# Patient Record
Sex: FEMALE | Race: BLACK OR AFRICAN AMERICAN | NOT HISPANIC OR LATINO | Employment: OTHER | URBAN - METROPOLITAN AREA
[De-identification: names, ages, dates, MRNs, and addresses within clinical notes are randomized per-mention and may not be internally consistent; named-entity substitution may affect disease eponyms.]

---

## 2024-02-06 ENCOUNTER — APPOINTMENT (OUTPATIENT)
Dept: PRIMARY CARE | Facility: CLINIC | Age: 54
End: 2024-02-06
Payer: MEDICARE

## 2024-03-05 ENCOUNTER — OFFICE VISIT (OUTPATIENT)
Dept: PRIMARY CARE | Facility: CLINIC | Age: 54
End: 2024-03-05
Payer: MEDICARE

## 2024-03-05 VITALS
HEART RATE: 78 BPM | WEIGHT: 245.6 LBS | TEMPERATURE: 97.3 F | SYSTOLIC BLOOD PRESSURE: 138 MMHG | HEIGHT: 65 IN | BODY MASS INDEX: 40.92 KG/M2 | DIASTOLIC BLOOD PRESSURE: 88 MMHG

## 2024-03-05 DIAGNOSIS — G47.8: ICD-10-CM

## 2024-03-05 DIAGNOSIS — Z12.39 BREAST SCREENING: Primary | ICD-10-CM

## 2024-03-05 DIAGNOSIS — B35.0 TINEA CAPITIS: ICD-10-CM

## 2024-03-05 DIAGNOSIS — Z12.31 ENCOUNTER FOR SCREENING MAMMOGRAM FOR MALIGNANT NEOPLASM OF BREAST: ICD-10-CM

## 2024-03-05 DIAGNOSIS — E78.9 LIPID DISORDER: ICD-10-CM

## 2024-03-05 DIAGNOSIS — I10 HYPERTENSION, UNSPECIFIED TYPE: ICD-10-CM

## 2024-03-05 DIAGNOSIS — I47.10 SVT (SUPRAVENTRICULAR TACHYCARDIA) (CMS-HCC): ICD-10-CM

## 2024-03-05 DIAGNOSIS — Z76.89 ENCOUNTER TO ESTABLISH CARE: ICD-10-CM

## 2024-03-05 PROCEDURE — 3075F SYST BP GE 130 - 139MM HG: CPT | Performed by: FAMILY MEDICINE

## 2024-03-05 PROCEDURE — 3079F DIAST BP 80-89 MM HG: CPT | Performed by: FAMILY MEDICINE

## 2024-03-05 PROCEDURE — 1036F TOBACCO NON-USER: CPT | Performed by: FAMILY MEDICINE

## 2024-03-05 PROCEDURE — 99204 OFFICE O/P NEW MOD 45 MIN: CPT | Performed by: FAMILY MEDICINE

## 2024-03-05 RX ORDER — KETOCONAZOLE 20 MG/ML
SHAMPOO, SUSPENSION TOPICAL 2 TIMES WEEKLY
Qty: 120 ML | Refills: 0 | Status: SHIPPED | OUTPATIENT
Start: 2024-03-07

## 2024-03-05 RX ORDER — TRIAMCINOLONE ACETONIDE 0.25 MG/ML
LOTION TOPICAL NIGHTLY
Qty: 60 ML | Refills: 3 | Status: SHIPPED | OUTPATIENT
Start: 2024-03-05
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
## 2024-03-11 ENCOUNTER — APPOINTMENT (OUTPATIENT)
Dept: RADIOLOGY | Facility: HOSPITAL | Age: 54
End: 2024-03-11
Payer: MEDICARE

## 2024-03-21 ENCOUNTER — HOSPITAL ENCOUNTER (OUTPATIENT)
Dept: RADIOLOGY | Facility: HOSPITAL | Age: 54
Discharge: HOME | End: 2024-03-21
Payer: MEDICARE

## 2024-03-21 DIAGNOSIS — Z12.39 BREAST SCREENING: ICD-10-CM

## 2024-03-21 DIAGNOSIS — Z12.31 ENCOUNTER FOR SCREENING MAMMOGRAM FOR MALIGNANT NEOPLASM OF BREAST: ICD-10-CM

## 2024-03-25 ENCOUNTER — APPOINTMENT (OUTPATIENT)
Dept: RADIOLOGY | Facility: HOSPITAL | Age: 54
End: 2024-03-25
Payer: MEDICARE

## 2024-03-29 ENCOUNTER — APPOINTMENT (OUTPATIENT)
Dept: RADIOLOGY | Facility: HOSPITAL | Age: 54
End: 2024-03-29
Payer: MEDICARE

## 2024-04-11 ENCOUNTER — APPOINTMENT (OUTPATIENT)
Dept: RADIOLOGY | Facility: HOSPITAL | Age: 54
End: 2024-04-11
Payer: MEDICARE

## 2024-04-15 ENCOUNTER — APPOINTMENT (OUTPATIENT)
Dept: RADIOLOGY | Facility: HOSPITAL | Age: 54
End: 2024-04-15
Payer: MEDICARE

## 2024-04-17 ENCOUNTER — APPOINTMENT (OUTPATIENT)
Dept: CARDIOLOGY | Facility: CLINIC | Age: 54
End: 2024-04-17
Payer: MEDICARE

## 2024-04-19 ENCOUNTER — APPOINTMENT (OUTPATIENT)
Dept: RADIOLOGY | Facility: HOSPITAL | Age: 54
End: 2024-04-19
Payer: MEDICARE

## 2024-04-23 ENCOUNTER — APPOINTMENT (OUTPATIENT)
Dept: RADIOLOGY | Facility: HOSPITAL | Age: 54
End: 2024-04-23
Payer: MEDICARE

## 2024-05-03 ENCOUNTER — APPOINTMENT (OUTPATIENT)
Dept: RADIOLOGY | Facility: HOSPITAL | Age: 54
End: 2024-05-03
Payer: MEDICARE

## 2024-05-10 ENCOUNTER — APPOINTMENT (OUTPATIENT)
Dept: CARDIOLOGY | Facility: HOSPITAL | Age: 54
End: 2024-05-10
Payer: MEDICARE

## 2024-05-21 ENCOUNTER — APPOINTMENT (OUTPATIENT)
Dept: RADIOLOGY | Facility: HOSPITAL | Age: 54
End: 2024-05-21
Payer: MEDICARE

## 2024-05-24 ENCOUNTER — APPOINTMENT (OUTPATIENT)
Dept: RADIOLOGY | Facility: HOSPITAL | Age: 54
End: 2024-05-24
Payer: MEDICARE

## 2024-06-04 ENCOUNTER — OFFICE VISIT (OUTPATIENT)
Dept: PRIMARY CARE | Facility: CLINIC | Age: 54
End: 2024-06-04
Payer: MEDICARE

## 2024-06-04 ENCOUNTER — APPOINTMENT (OUTPATIENT)
Dept: RADIOLOGY | Facility: HOSPITAL | Age: 54
End: 2024-06-04
Payer: MEDICARE

## 2024-06-04 VITALS
SYSTOLIC BLOOD PRESSURE: 124 MMHG | DIASTOLIC BLOOD PRESSURE: 85 MMHG | WEIGHT: 239.8 LBS | HEIGHT: 65 IN | TEMPERATURE: 97.7 F | BODY MASS INDEX: 39.95 KG/M2 | HEART RATE: 83 BPM

## 2024-06-04 DIAGNOSIS — I10 HYPERTENSION, UNSPECIFIED TYPE: ICD-10-CM

## 2024-06-04 DIAGNOSIS — K86.1 IDIOPATHIC CHRONIC PANCREATITIS (MULTI): ICD-10-CM

## 2024-06-04 DIAGNOSIS — G47.30 SLEEP APNEA IN ADULT: ICD-10-CM

## 2024-06-04 DIAGNOSIS — R53.83 OTHER FATIGUE: ICD-10-CM

## 2024-06-04 DIAGNOSIS — E78.9 LIPID DISORDER: Primary | ICD-10-CM

## 2024-06-04 DIAGNOSIS — F31.81 BIPOLAR 2 DISORDER, MAJOR DEPRESSIVE EPISODE (MULTI): ICD-10-CM

## 2024-06-04 PROCEDURE — G0444 DEPRESSION SCREEN ANNUAL: HCPCS | Performed by: FAMILY MEDICINE

## 2024-06-04 PROCEDURE — 99214 OFFICE O/P EST MOD 30 MIN: CPT | Performed by: FAMILY MEDICINE

## 2024-06-04 PROCEDURE — 99396 PREV VISIT EST AGE 40-64: CPT | Performed by: FAMILY MEDICINE

## 2024-06-04 PROCEDURE — G0439 PPPS, SUBSEQ VISIT: HCPCS | Performed by: FAMILY MEDICINE

## 2024-06-04 PROCEDURE — 3008F BODY MASS INDEX DOCD: CPT | Performed by: FAMILY MEDICINE

## 2024-06-04 PROCEDURE — G0446 INTENS BEHAVE THER CARDIO DX: HCPCS | Performed by: FAMILY MEDICINE

## 2024-06-04 PROCEDURE — G0447 BEHAVIOR COUNSEL OBESITY 15M: HCPCS | Performed by: FAMILY MEDICINE

## 2024-06-04 PROCEDURE — 1036F TOBACCO NON-USER: CPT | Performed by: FAMILY MEDICINE

## 2024-06-04 PROCEDURE — 3079F DIAST BP 80-89 MM HG: CPT | Performed by: FAMILY MEDICINE

## 2024-06-04 PROCEDURE — 1123F ACP DISCUSS/DSCN MKR DOCD: CPT | Performed by: FAMILY MEDICINE

## 2024-06-04 PROCEDURE — 3074F SYST BP LT 130 MM HG: CPT | Performed by: FAMILY MEDICINE

## 2024-06-04 RX ORDER — SERTRALINE HYDROCHLORIDE 100 MG/1
200 TABLET, FILM COATED ORAL DAILY
COMMUNITY

## 2024-06-04 RX ORDER — BUPROPION HYDROCHLORIDE 100 MG/1
100 TABLET ORAL 2 TIMES DAILY
COMMUNITY

## 2024-06-04 RX ORDER — INSULIN GLARGINE 100 [IU]/ML
38 INJECTION, SOLUTION SUBCUTANEOUS EVERY MORNING
COMMUNITY

## 2024-06-04 RX ORDER — ARIPIPRAZOLE 5 MG/1
5 TABLET ORAL DAILY
COMMUNITY

## 2024-06-04 RX ORDER — INSULIN ASPART 100 [IU]/ML
25 INJECTION, SOLUTION INTRAVENOUS; SUBCUTANEOUS
COMMUNITY

## 2024-06-04 RX ORDER — ROSUVASTATIN CALCIUM 20 MG/1
20 TABLET, COATED ORAL NIGHTLY
Qty: 90 TABLET | Refills: 3 | Status: SHIPPED | OUTPATIENT
Start: 2024-06-04

## 2024-06-04 RX ORDER — EZETIMIBE 10 MG/1
10 TABLET ORAL DAILY
COMMUNITY

## 2024-06-04 RX ORDER — ROSUVASTATIN CALCIUM 20 MG/1
20 TABLET, COATED ORAL NIGHTLY
COMMUNITY
End: 2024-06-04 | Stop reason: SDUPTHER

## 2024-06-04 ASSESSMENT — PATIENT HEALTH QUESTIONNAIRE - PHQ9
1. LITTLE INTEREST OR PLEASURE IN DOING THINGS: SEVERAL DAYS
10. IF YOU CHECKED OFF ANY PROBLEMS, HOW DIFFICULT HAVE THESE PROBLEMS MADE IT FOR YOU TO DO YOUR WORK, TAKE CARE OF THINGS AT HOME, OR GET ALONG WITH OTHER PEOPLE: NOT DIFFICULT AT ALL
2. FEELING DOWN, DEPRESSED OR HOPELESS: SEVERAL DAYS
SUM OF ALL RESPONSES TO PHQ9 QUESTIONS 1 AND 2: 2

## 2024-06-04 NOTE — PROGRESS NOTES
"Subjective   Patient ID: Meghana Schwartz is a 54 y.o. female who presents for Medicare Annual Wellness Visit Initial.    HPI     Review of Systems   All other systems reviewed and are negative.      Objective   /85   Pulse 83   Temp 36.5 °C (97.7 °F)   Ht 1.651 m (5' 5\")   Wt 109 kg (239 lb 12.8 oz)   LMP  (LMP Unknown)   BMI 39.90 kg/m²     Physical Exam  Cardiovascular:      Rate and Rhythm: Regular rhythm.   Pulmonary:      Breath sounds: Normal breath sounds.   Abdominal:      Palpations: Abdomen is soft.   Musculoskeletal:         General: Normal range of motion.   Skin:     General: Skin is warm.   Neurological:      General: No focal deficit present.      Mental Status: She is alert.   Psychiatric:         Mood and Affect: Mood normal.         Assessment/Plan     This is a 54-year-old female who is here for Medicare well visit, preventive exam, and follow-up from the previous visit    Health maintenance    I reviewed advance care planning I gave her information and forms to get filled and the time duration that I spent is over 16 minutes      I discussed and assess the  risk factors for cardiovascular disease.  I educated patient on a healthier behavior lifestyle changes.   I advised patient to walk at least 30 minutes a day 5 days a week.  I educated on healthy eating habits and also taking a baby aspirin to avoid cardiovascular accident    Cardiovascular risk assessment showed that she is at 24.3 -time spent is over 15 minutes    Depression screening showed that she is feeling depressed several times several days a week and she is under the care of of [facility] cognitive therapy as well as medication and the screening took about 5 minutes      Follow-up    Insomnia/sleep apnea    She says she has been using the CPAP machine but has difficulty in staying asleep she has posttraumatic stress disorder nightmares dreams    I had a conversation with her about sleep ritual she says she plays the Dick or Bro and " I advised her to focus on good rather than the stressful events when she goes to bed    And if she wakes up with bad dreams to take a mind over to good place rather than staying in the bed place        Morbid obesity again she informs me that she does twice a week water aerobics and lap swimming as well as she has been walking 1 mile a day    I referred her to nutritional counseling as well    The time spent on obesity counseling is over 15 minutes    For hyperlipidemia I renewed her prescription for rosuvastatin she had missed her cardiology appointment but she will reschedule    Also she has had the colonoscopy 5 years ago in [facility] she promised to get me the report    She will be going for her mammogram coming up    Advised patient to come back in 3-month    Since she has not had a Pap test for some time advised her to schedule her Pap test and follow-up in 3 months      I reviewed her whole chart updated her medication she goes to endocrinology at [facility] she is currently on Lantus and NovoLog along with Jardiance    She goes to psychological treatment at [facility] she is on Abilify, bupropion Zoloft    For hyperlipidemia she is on Zetia and Crestor    For seborrheic dermatitis she is continue on ketoconazole and triamcinolone

## 2024-06-07 ENCOUNTER — APPOINTMENT (OUTPATIENT)
Dept: RADIOLOGY | Facility: HOSPITAL | Age: 54
End: 2024-06-07
Payer: MEDICARE

## 2024-07-02 ENCOUNTER — APPOINTMENT (OUTPATIENT)
Dept: RADIOLOGY | Facility: HOSPITAL | Age: 54
End: 2024-07-02
Payer: MEDICARE

## 2024-09-10 ENCOUNTER — APPOINTMENT (OUTPATIENT)
Dept: PRIMARY CARE | Facility: CLINIC | Age: 54
End: 2024-09-10
Payer: MEDICARE

## 2024-09-19 ENCOUNTER — HOSPITAL ENCOUNTER (OUTPATIENT)
Dept: RADIOLOGY | Facility: CLINIC | Age: 54
Discharge: HOME | End: 2024-09-19
Payer: MEDICARE

## 2024-09-19 VITALS — WEIGHT: 230 LBS | HEIGHT: 65 IN | BODY MASS INDEX: 38.32 KG/M2

## 2024-09-19 PROCEDURE — 77067 SCR MAMMO BI INCL CAD: CPT

## 2024-10-01 ENCOUNTER — APPOINTMENT (OUTPATIENT)
Dept: PRIMARY CARE | Facility: CLINIC | Age: 54
End: 2024-10-01
Payer: MEDICARE

## 2024-10-03 ENCOUNTER — HOSPITAL ENCOUNTER (OUTPATIENT)
Dept: RADIOLOGY | Facility: EXTERNAL LOCATION | Age: 54
Discharge: HOME | End: 2024-10-03

## 2024-10-28 ENCOUNTER — APPOINTMENT (OUTPATIENT)
Dept: PRIMARY CARE | Facility: CLINIC | Age: 54
End: 2024-10-28
Payer: MEDICARE

## 2024-11-13 ENCOUNTER — APPOINTMENT (OUTPATIENT)
Dept: PRIMARY CARE | Facility: CLINIC | Age: 54
End: 2024-11-13
Payer: MEDICARE

## 2024-11-25 ENCOUNTER — APPOINTMENT (OUTPATIENT)
Dept: PRIMARY CARE | Facility: CLINIC | Age: 54
End: 2024-11-25
Payer: MEDICARE

## 2024-12-10 ENCOUNTER — APPOINTMENT (OUTPATIENT)
Dept: PRIMARY CARE | Facility: CLINIC | Age: 54
End: 2024-12-10
Payer: MEDICARE

## 2025-01-02 ASSESSMENT — ENCOUNTER SYMPTOMS
SWOLLEN GLANDS: 0
NECK PAIN: 0
ORTHOPNEA: 0
HEADACHES: 0
PND: 0
LEG PAIN: 0
CLAUDICATION: 0
WHEEZING: 1
HEMOPTYSIS: 0
VOMITING: 0
SYNCOPE: 0
SORE THROAT: 0
SPUTUM PRODUCTION: 1
ABDOMINAL PAIN: 0
RHINORRHEA: 1
FEVER: 0
SHORTNESS OF BREATH: 1

## 2025-01-06 ENCOUNTER — APPOINTMENT (OUTPATIENT)
Dept: PRIMARY CARE | Facility: CLINIC | Age: 55
End: 2025-01-06
Payer: MEDICARE

## 2025-01-06 VITALS
HEART RATE: 98 BPM | DIASTOLIC BLOOD PRESSURE: 94 MMHG | BODY MASS INDEX: 39.99 KG/M2 | SYSTOLIC BLOOD PRESSURE: 152 MMHG | HEIGHT: 65 IN | WEIGHT: 240 LBS | OXYGEN SATURATION: 97 %

## 2025-01-06 DIAGNOSIS — B35.0 TINEA CAPITIS: Primary | ICD-10-CM

## 2025-01-06 DIAGNOSIS — I10 HYPERTENSION, UNSPECIFIED TYPE: ICD-10-CM

## 2025-01-06 DIAGNOSIS — R05.1 ACUTE COUGH: ICD-10-CM

## 2025-01-06 DIAGNOSIS — Z01.419 ENCOUNTER FOR GYNECOLOGICAL EXAMINATION WITHOUT ABNORMAL FINDING: ICD-10-CM

## 2025-01-06 PROCEDURE — 3080F DIAST BP >= 90 MM HG: CPT | Performed by: FAMILY MEDICINE

## 2025-01-06 PROCEDURE — 3008F BODY MASS INDEX DOCD: CPT | Performed by: FAMILY MEDICINE

## 2025-01-06 PROCEDURE — 1036F TOBACCO NON-USER: CPT | Performed by: FAMILY MEDICINE

## 2025-01-06 PROCEDURE — 99214 OFFICE O/P EST MOD 30 MIN: CPT | Performed by: FAMILY MEDICINE

## 2025-01-06 PROCEDURE — 3077F SYST BP >= 140 MM HG: CPT | Performed by: FAMILY MEDICINE

## 2025-01-06 RX ORDER — LOSARTAN POTASSIUM 100 MG/1
100 TABLET ORAL DAILY
Qty: 100 TABLET | Refills: 3 | Status: SHIPPED | OUTPATIENT
Start: 2025-01-06 | End: 2025-01-07 | Stop reason: SDUPTHER

## 2025-01-06 RX ORDER — FLUTICASONE PROPIONATE AND SALMETEROL 250; 50 UG/1; UG/1
1 POWDER RESPIRATORY (INHALATION)
Qty: 60 EACH | Refills: 11 | Status: SHIPPED | OUTPATIENT
Start: 2025-01-06 | End: 2025-01-07 | Stop reason: SDUPTHER

## 2025-01-06 RX ORDER — BENZONATATE 100 MG/1
100 CAPSULE ORAL 3 TIMES DAILY PRN
Qty: 42 CAPSULE | Refills: 0 | Status: SHIPPED | OUTPATIENT
Start: 2025-01-06 | End: 2025-01-07 | Stop reason: SDUPTHER

## 2025-01-06 RX ORDER — MONTELUKAST SODIUM 10 MG/1
10 TABLET ORAL NIGHTLY
Qty: 30 TABLET | Refills: 5 | Status: SHIPPED | OUTPATIENT
Start: 2025-01-06 | End: 2025-01-07 | Stop reason: SDUPTHER

## 2025-01-06 RX ORDER — TRIAMCINOLONE ACETONIDE 0.25 MG/ML
LOTION TOPICAL NIGHTLY
Qty: 60 ML | Refills: 3 | Status: SHIPPED | OUTPATIENT
Start: 2025-01-06 | End: 2025-01-07 | Stop reason: SDUPTHER

## 2025-01-06 RX ORDER — ALBUTEROL SULFATE 90 UG/1
2 INHALANT RESPIRATORY (INHALATION) EVERY 4 HOURS PRN
Qty: 8 G | Refills: 5 | Status: SHIPPED | OUTPATIENT
Start: 2025-01-06 | End: 2025-01-07 | Stop reason: SDUPTHER

## 2025-01-06 ASSESSMENT — COLUMBIA-SUICIDE SEVERITY RATING SCALE - C-SSRS
6. HAVE YOU EVER DONE ANYTHING, STARTED TO DO ANYTHING, OR PREPARED TO DO ANYTHING TO END YOUR LIFE?: NO
2. HAVE YOU ACTUALLY HAD ANY THOUGHTS OF KILLING YOURSELF?: NO
1. IN THE PAST MONTH, HAVE YOU WISHED YOU WERE DEAD OR WISHED YOU COULD GO TO SLEEP AND NOT WAKE UP?: NO

## 2025-01-06 ASSESSMENT — PATIENT HEALTH QUESTIONNAIRE - PHQ9
SUM OF ALL RESPONSES TO PHQ9 QUESTIONS 1 AND 2: 2
10. IF YOU CHECKED OFF ANY PROBLEMS, HOW DIFFICULT HAVE THESE PROBLEMS MADE IT FOR YOU TO DO YOUR WORK, TAKE CARE OF THINGS AT HOME, OR GET ALONG WITH OTHER PEOPLE: SOMEWHAT DIFFICULT
1. LITTLE INTEREST OR PLEASURE IN DOING THINGS: SEVERAL DAYS
2. FEELING DOWN, DEPRESSED OR HOPELESS: SEVERAL DAYS

## 2025-01-06 NOTE — PROGRESS NOTES
This is a 54-year-old female patient with insulin-dependent diabetes morbid obesity is here complaining uncontrollable cough    She is tested negative for COVID    No fever no productive cough    Respiratory exam lung sounds normal    She does have a history of allergy asthma therefore I prescribed her medicine for allergy and maintenance inhaler    Also her blood pressure is high with diabetes I educated her pressure should be weight down and started on losartan    I also advised her to discuss with the endocrinologist about her going on GLP-1 to control her appetite and make her lose weight she will discuss with the endocrinologist that appointment is coming up    She says she had a colonoscopy back in 2021 at [facility] but I do not have those results but she promised to get me those results    Referred patient for gynecologist for regular gynecological preventive exams    Advised patient to come back in 3 months

## 2025-01-06 NOTE — PATIENT INSTRUCTIONS
Blood pressure was quite high with your diabetes it should not be 120/80 yours is 152/94    By working on your weight your sugar will be better your blood pressure will be better sleep apnea can be controlled therefore I would suggest for you to speak to the your endocrinologist about getting the once a week shot    I have started you on a medicine called losartan please come back in 3 months to evaluate your blood pressure    I do not have the report on your colonoscopy you informed me back in 21 you had it but I would appreciate if you can get that report to me    For your cough I wanted to get over-the-counter Zyrtec Allegra or loratadine I also prescribed montelukast    Combine the antihistamine with the montelukast and also I prescribed Advair 1 puff twice a day albuterol to be taken only when necessary    Also I prescribed a Tessalon Perle which is a cough suppressant    See you back in 3 months okay

## 2025-01-07 DIAGNOSIS — I10 HYPERTENSION, UNSPECIFIED TYPE: ICD-10-CM

## 2025-01-07 DIAGNOSIS — B35.0 TINEA CAPITIS: ICD-10-CM

## 2025-01-07 DIAGNOSIS — R05.1 ACUTE COUGH: ICD-10-CM

## 2025-01-07 RX ORDER — BENZONATATE 100 MG/1
100 CAPSULE ORAL 3 TIMES DAILY PRN
Qty: 42 CAPSULE | Refills: 0 | Status: SHIPPED | OUTPATIENT
Start: 2025-01-07 | End: 2025-02-06

## 2025-01-07 RX ORDER — ALBUTEROL SULFATE 90 UG/1
2 INHALANT RESPIRATORY (INHALATION) EVERY 4 HOURS PRN
Qty: 8 G | Refills: 5 | Status: SHIPPED | OUTPATIENT
Start: 2025-01-07 | End: 2026-01-07

## 2025-01-07 RX ORDER — TRIAMCINOLONE ACETONIDE 0.25 MG/ML
LOTION TOPICAL NIGHTLY
Qty: 60 ML | Refills: 3 | Status: SHIPPED | OUTPATIENT
Start: 2025-01-07

## 2025-01-07 RX ORDER — MONTELUKAST SODIUM 10 MG/1
10 TABLET ORAL NIGHTLY
Qty: 30 TABLET | Refills: 5 | Status: SHIPPED | OUTPATIENT
Start: 2025-01-07 | End: 2025-07-06

## 2025-01-07 RX ORDER — LOSARTAN POTASSIUM 100 MG/1
100 TABLET ORAL DAILY
Qty: 100 TABLET | Refills: 3 | Status: SHIPPED | OUTPATIENT
Start: 2025-01-07 | End: 2026-02-11

## 2025-01-07 RX ORDER — FLUTICASONE PROPIONATE AND SALMETEROL 250; 50 UG/1; UG/1
1 POWDER RESPIRATORY (INHALATION)
Qty: 60 EACH | Refills: 11 | Status: SHIPPED | OUTPATIENT
Start: 2025-01-07 | End: 2026-01-07

## 2025-04-15 ENCOUNTER — APPOINTMENT (OUTPATIENT)
Dept: PRIMARY CARE | Facility: CLINIC | Age: 55
End: 2025-04-15
Payer: MEDICARE

## 2025-04-17 NOTE — PROGRESS NOTES
Primary Care Physician: Floridalma Pike MD  Date of Visit: 04/18/2025 10:00 AM EDT      Chief Complaint:     Pt referred by *** for ***     HPI / Summary:   Meghana Schwartz is a 54 y.o. female with insulin dependent diabetes, obesity, bipolar        Last Cardiology Tests:  ECG:    Echo:    Cath:      Stress Test:    Cardiac Imaging:      Past Medical History:  Medical History[1]     Past Surgical History:  Surgical History[2]       Social History:  She reports that she has never smoked. She has never used smokeless tobacco. She reports that she does not drink alcohol and does not use drugs.    Family History:  family history includes Breast cancer (age of onset: 30 - 39) in her cousin; Breast cancer (age of onset: 60 - 69) in her mother's sister.      Allergies:  RX Allergies[3]    Outpatient Medications:  Current Outpatient Medications   Medication Instructions    albuterol (Ventolin HFA) 90 mcg/actuation inhaler 2 puffs, inhalation, Every 4 hours PRN    ARIPiprazole (ABILIFY) 5 mg, Daily    buPROPion (WELLBUTRIN) 100 mg, 2 times daily    empagliflozin (JARDIANCE) 10 mg, Daily    ezetimibe (ZETIA) 10 mg, Daily    fluticasone propion-salmeteroL (Advair Diskus) 250-50 mcg/dose diskus inhaler 1 puff, inhalation, 2 times daily RT, Rinse mouth with water after use to reduce aftertaste and incidence of candidiasis. Do not swallow.    insulin aspart (NOVOLOG U-100 INSULIN ASPART) 25 Units, 3 times daily before meals    ketoconazole (NIZOral) 2 % shampoo Topical, 2 times weekly, Shampoo daily, leave on for 5-10 minutes, then rinse.    Lantus U-100 Insulin 38 Units, Every morning    losartan (COZAAR) 100 mg, oral, Daily    montelukast (SINGULAIR) 10 mg, oral, Nightly    rosuvastatin (CRESTOR) 20 mg, oral, Nightly    sertraline (ZOLOFT) 200 mg, Daily    triamcinolone acetonide 0.025 % lotion topical (top), Nightly       Review of Systems:  A complete 10 point ROS was performed and is negative except for HPI    Physical  "Exam:  ***  There were no vitals filed for this visit.  Wt Readings from Last 5 Encounters:   01/06/25 109 kg (240 lb)   09/19/24 104 kg (230 lb)   06/04/24 109 kg (239 lb 12.8 oz)   03/05/24 111 kg (245 lb 9.6 oz)     There is no height or weight on file to calculate BMI.        Last Labs:  CMP:No results for input(s): \"NA\", \"K\", \"CL\", \"CO2\", \"ANIONGAP\", \"BUN\", \"CREATININE\", \"EGFR\", \"GLUCOSE\" in the last 88371 hours.No results for input(s): \"ALBUMIN\", \"ALKPHOS\", \"ALT\", \"AST\", \"BILITOT\", \"LIPASE\" in the last 15594 hours.    No lab exists for component: \"CA\"  CBC:No results for input(s): \"WBC\", \"HGB\", \"HCT\", \"PLT\", \"MCV\" in the last 43525 hours.  COAG: No results for input(s): \"INR\", \"DDIMERVTE\" in the last 70259 hours.  ENDO:  Recent Labs     02/10/25  1136 03/04/24  0937 07/05/23  1130 05/04/22  1441   HGBA1C 9.8* 8.7* 10.8* 8.3*      CARDIAC: No results for input(s): \"CKMB\", \"TROPHS\", \"BNP\" in the last 05908 hours.    No lab exists for component: \"CK\", \"CKMBP\"No results for input(s): \"CHOL\", \"LDLF\", \"LDL\", \"LDLCALC\", \"HDL\", \"TRIG\" in the last 88963 hours.  Component  Ref Range & Units 2 mo ago   Total Cholesterol, Nonfasting  <200 mg/dL 143   Comment: <200 mg/dL, Desirable   200-239 mg/dL, Borderline high  >239 mg/dL, High   Triglycerides, Nonfasting  <150 mg/dL 120   Comment: <150 mg/dL, Normal   150-199 mg/dL, Borderline high   200-499 mg/dL, High  >499 mg/dL, Very high   HDL Cholesterol, Nonfasting  >39 mg/dL 58   Comment:  40-59 mg/dL, Acceptable  >59 mg/dL, High: Negative risk factor for coronary heart disease  <40 mg/dL, Low: Positive risk factor for coronary heart disease   LDL Cholesterol, Nonfasting  <100 mg/dL 61   Comment: <100 mg/dL, Optimal   100-129 mg/dL, Near optimal/above optimal   130-159 mg/dL, Borderline high   160-189 mg/dL, High  >189 mg/dL, Very high  Secondary prevention optimal LDL Cholesterol levels are recommended to be < 70 mg/dL   Non HDL Cholesterol, Nonfasting  <130 mg/dL 85 "   Comment: <130 mg/dL, Optimal   130-159 mg/dL, Near optimal/above optimal   160-189 mg/dL, Borderline high   190-219 mg/dL, High  >219 mg/dL, Very high  Secondary prevention optimal non HDL Cholesterol levels are recommended to be <100 mg/dL   VLDL Cholesterol, Nonfasting  <30 mg/dL 24   Total Chol/HDL Ratio, Nonfasting  <5.10 mg/dL 2.47   LDL/HDL Ratio, Nonfasting  <2.54 mg/dL 1.05   Comment: Reference:  1. National Cholesterol Education Program ATP III Guideline At-A-Glance Quick Desk Reference: National Heart, Lung, and Blood [place]. National Institutes of Health. 2001: NIH Publication No. .  2. An International Atherosclerosis Society position paper: global recommendations for the management of dyslipidemia: executive summary, Atherosclerosis. 2014: 232(2):410-413.             Assessment/Plan   {Assess/Plan SmartLinks (Optional):44862}  ***      Followup Appts:  Future Appointments   Date Time Provider Department Center   4/18/2025 10:00 AM Catalino Dacosta DO [id number] [facility]   6/18/2025  1:00 PM Floridalma Pike MD [id number] [facility]           ____________________________________________________________  Catalino Dacosta DO  [facility]       [1] No past medical history on file.  [2] No past surgical history on file.  [3]   Allergies  Allergen Reactions    Marcaine [Bupivacaine] Anaphylaxis    Ace Inhibitors Angioedema    Demerol [Meperidine] Agitation    Hydrochlorothiazide Unknown    Metformin Diarrhea    Phenergan [Promethazine] Agitation    Reglan [Metoclopramide Hcl] Agitation

## 2025-04-18 ENCOUNTER — APPOINTMENT (OUTPATIENT)
Dept: CARDIOLOGY | Facility: CLINIC | Age: 55
End: 2025-04-18
Payer: MEDICARE

## 2025-06-18 ENCOUNTER — APPOINTMENT (OUTPATIENT)
Dept: PRIMARY CARE | Facility: CLINIC | Age: 55
End: 2025-06-18
Payer: MEDICARE

## 2025-09-23 ENCOUNTER — APPOINTMENT (OUTPATIENT)
Dept: PRIMARY CARE | Facility: CLINIC | Age: 55
End: 2025-09-23
Payer: MEDICARE